# Patient Record
Sex: MALE | Race: WHITE | NOT HISPANIC OR LATINO | Employment: FULL TIME | URBAN - METROPOLITAN AREA
[De-identification: names, ages, dates, MRNs, and addresses within clinical notes are randomized per-mention and may not be internally consistent; named-entity substitution may affect disease eponyms.]

---

## 2019-04-29 ENCOUNTER — HOSPITAL ENCOUNTER (EMERGENCY)
Facility: HOSPITAL | Age: 39
Discharge: HOME | End: 2019-04-29
Attending: EMERGENCY MEDICINE | Admitting: EMERGENCY MEDICINE

## 2019-04-29 VITALS
WEIGHT: 250 LBS | HEART RATE: 81 BPM | BODY MASS INDEX: 30.44 KG/M2 | HEIGHT: 76 IN | DIASTOLIC BLOOD PRESSURE: 88 MMHG | SYSTOLIC BLOOD PRESSURE: 148 MMHG | RESPIRATION RATE: 18 BRPM | OXYGEN SATURATION: 99 % | TEMPERATURE: 98.2 F

## 2019-04-29 DIAGNOSIS — L08.9: Primary | ICD-10-CM

## 2019-04-29 DIAGNOSIS — S30.860A: Primary | ICD-10-CM
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
## 2019-04-29 PROCEDURE — 99282 EMERGENCY DEPT VISIT SF MDM: CPT | Performed by: PHYSICIAN ASSISTANT

## 2019-04-29 PROCEDURE — 99283 EMERGENCY DEPT VISIT LOW MDM: CPT

## 2019-04-29 RX ORDER — DOXYCYCLINE 100 MG/1
100 CAPSULE ORAL ONCE
Status: COMPLETED | OUTPATIENT
Start: 2019-04-29 | End: 2019-04-29

## 2019-04-29 RX ORDER — DOXYCYCLINE 100 MG/1
100 CAPSULE ORAL 2 TIMES DAILY
Qty: 28 CAPSULE | Refills: 0 | Status: SHIPPED | OUTPATIENT
Start: 2019-04-29 | End: 2019-05-13

## 2019-04-29 RX ADMIN — DOXYCYCLINE 100 MG: 100 CAPSULE ORAL at 20:27

## 2021-03-30 ENCOUNTER — IMMUNIZATION (OUTPATIENT)
Dept: VACCINE CLINIC | Facility: HOSPITAL | Age: 41
End: 2021-03-30

## 2021-03-30 PROCEDURE — 91300 HC SARSCOV02 VAC 30MCG/0.3ML IM: CPT | Performed by: OBSTETRICS & GYNECOLOGY

## 2021-03-30 PROCEDURE — 0001A: CPT | Performed by: OBSTETRICS & GYNECOLOGY

## 2021-04-20 ENCOUNTER — IMMUNIZATION (OUTPATIENT)
Dept: VACCINE CLINIC | Facility: HOSPITAL | Age: 41
End: 2021-04-20

## 2021-04-20 PROCEDURE — 0002A: CPT | Performed by: OBSTETRICS & GYNECOLOGY

## 2021-04-20 PROCEDURE — 91300 HC SARSCOV02 VAC 30MCG/0.3ML IM: CPT | Performed by: OBSTETRICS & GYNECOLOGY

## 2022-09-17 ENCOUNTER — APPOINTMENT (OUTPATIENT)
Dept: GENERAL RADIOLOGY | Facility: HOSPITAL | Age: 42
End: 2022-09-17

## 2022-09-17 ENCOUNTER — HOSPITAL ENCOUNTER (EMERGENCY)
Facility: HOSPITAL | Age: 42
Discharge: HOME | End: 2022-09-17
Attending: EMERGENCY MEDICINE | Admitting: EMERGENCY MEDICINE

## 2022-09-17 VITALS
RESPIRATION RATE: 17 BRPM | SYSTOLIC BLOOD PRESSURE: 183 MMHG | TEMPERATURE: 98.8 F | HEART RATE: 60 BPM | OXYGEN SATURATION: 96 % | DIASTOLIC BLOOD PRESSURE: 104 MMHG

## 2022-09-17 DIAGNOSIS — S50.12XA CONTUSION OF LEFT FOREARM, INITIAL ENCOUNTER: Primary | ICD-10-CM

## 2022-09-17 PROCEDURE — 99283 EMERGENCY DEPT VISIT LOW MDM: CPT

## 2022-09-17 PROCEDURE — 73090 X-RAY EXAM OF FOREARM: CPT

## 2022-09-17 PROCEDURE — 73070 X-RAY EXAM OF ELBOW: CPT

## 2022-09-17 PROCEDURE — 73110 X-RAY EXAM OF WRIST: CPT

## 2022-09-17 RX ORDER — IBUPROFEN 600 MG/1
600 TABLET ORAL 3 TIMES DAILY
Qty: 30 TABLET | Refills: 0 | Status: SHIPPED | OUTPATIENT
Start: 2022-09-17

## 2022-09-18 NOTE — ED PROVIDER NOTES
Subjective   History of Present Illness  This is a 42-year-old man with a chief complaint of pain to the left forearm area.  Approximately 4 PM the patient tripped over a few boxes and landed on his outstretched arm.  He complains of pain just proximal to the wrist and the forearm area, as well as pain in the elbow area.  The pain is sharp, it hurts when he moves the area.  He denies any other injuries.  He has no problems moving his fingers.  The patient or the person giving the history on their behalf denies any other precipitating, aggravating, or alleviating symptoms than those listed above.        Review of Systems   Musculoskeletal: Positive for arthralgias and myalgias.   All other systems reviewed and are negative.      History reviewed. No pertinent past medical history.    No Known Allergies    History reviewed. No pertinent surgical history.    History reviewed. No pertinent family history.    Social History     Socioeconomic History   • Marital status:    Tobacco Use   • Smoking status: Current Every Day Smoker     Packs/day: 1.00   Substance and Sexual Activity   • Alcohol use: No   • Drug use: Defer   • Sexual activity: Defer           Objective   Physical Exam  Vitals and nursing note reviewed.   Constitutional:       Appearance: Normal appearance. He is normal weight.   Musculoskeletal:      Comments: There is moderate tenderness to palpation over the radial portion of the left forearm, approximately 4 cm above the wrist area.  There is moderate tenderness across the wrist area, without definitive snuffbox tenderness.  The patient also complains of mild tenderness to palpation in the elbow area to palpation.  Pulses are normal as are capillary refill, movement of the fingers, and sensation of the arm and hand.   Skin:     General: Skin is warm and dry.      Capillary Refill: Capillary refill takes less than 2 seconds.   Neurological:      General: No focal deficit present.      Mental Status:  He is alert and oriented to person, place, and time. Mental status is at baseline.   Psychiatric:         Mood and Affect: Mood normal.         Behavior: Behavior normal.         Thought Content: Thought content normal.         Judgment: Judgment normal.         Procedures           ED Course  ED Course as of 09/17/22 2303   Sat Sep 17, 2022   2302 All films negative [LC]      ED Course User Index  [LC] Nelsy Rubi MD                                           MDM  Number of Diagnoses or Management Options     Amount and/or Complexity of Data Reviewed  Tests in the radiology section of CPT®: ordered        Final diagnoses:   Contusion of left forearm, initial encounter       ED Disposition  ED Disposition     ED Disposition   Discharge    Condition   Stable    Comment   --             Surjit Yarbrough MD  [address] [facility] [address] [phone]      As needed         Medication List      New Prescriptions    ibuprofen 600 MG tablet  Commonly known as: ADVIL,MOTRIN  Take 1 tablet by mouth 3 (Three) Times a Day.           Where to Get Your Medications      You can get these medications from any pharmacy    Bring a paper prescription for each of these medications  · ibuprofen 600 MG tablet          Nelsy Rubi MD  09/17/22 2303

## 2022-09-18 NOTE — ED NOTES
This nurse provided discharge instructions to pt for after visit care and for prescribed medication. Pt denied any questions and verbalized understanding.

## 2024-05-08 ENCOUNTER — HOSPITAL ENCOUNTER (EMERGENCY)
Facility: HOSPITAL | Age: 44
Discharge: HOME | End: 2024-05-08
Attending: STUDENT IN AN ORGANIZED HEALTH CARE EDUCATION/TRAINING PROGRAM
Payer: COMMERCIAL

## 2024-05-08 VITALS
SYSTOLIC BLOOD PRESSURE: 164 MMHG | HEIGHT: 76 IN | BODY MASS INDEX: 26.46 KG/M2 | OXYGEN SATURATION: 99 % | WEIGHT: 217.3 LBS | RESPIRATION RATE: 18 BRPM | TEMPERATURE: 97.7 F | HEART RATE: 58 BPM | DIASTOLIC BLOOD PRESSURE: 110 MMHG

## 2024-05-08 DIAGNOSIS — H81.10 BENIGN PAROXYSMAL POSITIONAL VERTIGO, UNSPECIFIED LATERALITY: Primary | ICD-10-CM

## 2024-05-08 PROCEDURE — 93005 ELECTROCARDIOGRAM TRACING: CPT

## 2024-05-08 PROCEDURE — 99283 EMERGENCY DEPT VISIT LOW MDM: CPT

## 2024-05-08 RX ORDER — MECLIZINE HYDROCHLORIDE 25 MG/1
25 TABLET ORAL 3 TIMES DAILY PRN
Qty: 60 TABLET | Refills: 0 | Status: SHIPPED | OUTPATIENT
Start: 2024-05-08 | End: 2024-05-28

## 2024-05-08 RX ORDER — MECLIZINE HYDROCHLORIDE 25 MG/1
25 TABLET ORAL ONCE
Status: COMPLETED | OUTPATIENT
Start: 2024-05-08 | End: 2024-05-08

## 2024-05-08 RX ORDER — SODIUM CHLORIDE 0.9 % (FLUSH) 0.9 %
10 SYRINGE (ML) INJECTION AS NEEDED
Status: DISCONTINUED | OUTPATIENT
Start: 2024-05-08 | End: 2024-05-08 | Stop reason: HOSPADM

## 2024-05-08 RX ADMIN — MECLIZINE HYDROCHLORIDE 25 MG: 25 TABLET ORAL at 17:34

## 2024-05-08 NOTE — Clinical Note
[facility]  [facility] EMERGENCY DEPARTMENT  [address] [place] [address]  Phone: [phone]    Mayito Mccray was seen and treated in our emergency department on 5/8/2024.  He may return to work on 05/13/2024.         Thank you for choosing [facility].    Jerry Humphrey, DO

## 2024-05-08 NOTE — DISCHARGE INSTRUCTIONS

## 2024-05-08 NOTE — ED PROVIDER NOTES
"Subjective   History of Present Illness  This is a very pleasant 44-year-old male who presents emergency department after being seen and evaluated at the urgent care for dizziness.  The patient states that he does not feel confused but states that he feels \"foggy.\"  Patient denies any nausea, vomiting but does admit to vertigo that is worse when he moves his head to the right and flexes his neck.  He denies tinnitus, recent illness, cough, fever, neck stiffness, headache, focal deficits, diplopia, dysarthria, dysphagia.       Review of Systems   Constitutional:  Negative for activity change, chills, diaphoresis and fever.   HENT: Negative.     Respiratory: Negative.     Cardiovascular: Negative.    Skin: Negative.    Neurological: Negative.  Positive for dizziness.   All other systems reviewed and are negative.      History reviewed. No pertinent past medical history.    No Known Allergies    History reviewed. No pertinent surgical history.    History reviewed. No pertinent family history.    Social History     Socioeconomic History    Marital status:    Tobacco Use    Smoking status: Every Day     Current packs/day: 1.00     Types: Cigarettes    Smokeless tobacco: Never   Vaping Use    Vaping status: Never Used   Substance and Sexual Activity    Alcohol use: No    Drug use: Never    Sexual activity: Defer           Objective   Physical Exam  Vitals and nursing note reviewed.   Constitutional:       Appearance: Normal appearance. He is normal weight. He is not ill-appearing, toxic-appearing or diaphoretic.   HENT:      Head: Normocephalic and atraumatic.      Right Ear: External ear normal.      Left Ear: External ear normal.      Nose: Nose normal. No congestion or rhinorrhea.      Mouth/Throat:      Pharynx: No oropharyngeal exudate or posterior oropharyngeal erythema.   Eyes:      Extraocular Movements: Extraocular movements intact.      Conjunctiva/sclera: Conjunctivae normal.      Pupils: Pupils are " equal, round, and reactive to light.   Cardiovascular:      Rate and Rhythm: Normal rate and regular rhythm.      Pulses: Normal pulses.   Pulmonary:      Effort: Pulmonary effort is normal.      Breath sounds: Normal breath sounds. No stridor. No wheezing, rhonchi or rales.   Chest:      Chest wall: No tenderness.   Abdominal:      General: There is no distension.      Palpations: Abdomen is soft. There is no mass.   Musculoskeletal:         General: No swelling, tenderness or signs of injury. Normal range of motion.      Cervical back: Normal range of motion. No rigidity or tenderness.   Skin:     General: Skin is warm.      Capillary Refill: Capillary refill takes less than 2 seconds.      Coloration: Skin is not jaundiced or pale.      Findings: No bruising.   Neurological:      General: No focal deficit present.      Mental Status: He is alert and oriented to person, place, and time. Mental status is at baseline.      Motor: No weakness.      Comments: On hints exam the patient has a unidirectional nystagmus with no vertical nystagmus, a corrective saccade which is reassuring and no evidence of skew. The HINTS is overall very reassuring.  Finger-nose within normal limits, ambulatory without evidence of ataxia.             Procedures           ED Course                                             Medical Decision Making    MDM:    Escalation of care including admission/observation considered    - Discussions of management with other providers:  None    - Discussed/reviewed with Radiology regarding test interpretation    - Independent interpretation: None    - Additional patient history obtained from: [facility]    - Review of external non-ED record (if available):  Prior Inpt record, Office record, Outpt record, Prior Outpt labs, PCP record, Outside ED record, Other    - Chronic conditions affecting care: See HPI and medical Hx.    - Social Determinants of health significantly affecting care:  None        Medical  Decision Making Discussion:    This is a 44-year-old who presents to the emergency department with what is likely benign paroxysmal positional vertigo.  Patient has no tinnitus, I do not suspect any labyrinth-itis, I do not suspect Ménière's syndrome.  He is given meclizine here in discussion as to continued meclizine use, fluid resuscitation.  He has no evidence of focal neurological deficit, no cerebellar evidence of abnormality.  He has a very reassuring hints exam and we discussed at length how to do the Epley maneuver at home and the use of meclizine on an ongoing basis as needed for his vertigo.  The patient is very agreeable to this, he is otherwise extremely well-appearing, he will perform the Epley maneuver at home and he will return here promptly if there is any acute change or worsening of his condition, the patient is otherwise hemodynamically stable, well-appearing, afebrile and stable for discharge.    The patient has been given very strict return precautions to return to the emergency department should there be any acute change or worsening of their condition.  I have explained my findings and the patient has expressed understanding to me.  I explained that the work-up performed in the ED has been based on the specific complaint and concern, as the nature of emergency medicine is complaint driven and they understand that new symptoms may arise.  I have told them that, should there be any new symptoms, worsening or changing symptoms, a new work-up may be indicated that they are encouraged to return to the emergency department or promptly contact their primary care physician. We have employed a shared decision-making process as the discussion of their disposition.  The patient has been educated as to the nature of the visit, the tests and work-up performed and the findings from today's visit. At this time, there does not appear to be any acute emergent process that necessitates admission to the  hospital, however, the patient understands that this can change unexpectedly. At this time, the patient is stable for discharge home and agrees to follow-up with her primary care physician in the next 24 to 48 hours or earlier should they be able to obtain an appointment.    The patient was counseled regarding diagnostic results and treatment plan and patient has indicated understanding of these instructions.  2        Final diagnoses:   Benign paroxysmal positional vertigo, unspecified laterality       ED Disposition  ED Disposition       ED Disposition   Discharge    Condition   Good    Comment   --               Surjit Yarbrough MD  [address] [facility] [address] [phone]    In 1 week  As needed, If symptoms worsen         Medication List        New Prescriptions      meclizine 25 MG tablet  Commonly known as: ANTIVERT  Take 1 tablet by mouth 3 (Three) Times a Day As Needed for Dizziness for up to 20 days.               Where to Get Your Medications        These medications were sent to [id number] - [facility] SAUC[address] [place] [address] [place] [address] AT [facility] & [address] - [phone]  - [phone] [place] [address], [place] [address]      Phone: [phone]   meclizine 25 MG tablet            Jerry Humphrey,   05/08/24 8315

## 2024-05-08 NOTE — ED TRIAGE NOTES
Pt via PV from home with c/o confusion/dizziness (worse with head movement)/unsteady since Saturday; N/V since last night.   Pt is AAOX4 at this time

## 2024-05-09 LAB
QT INTERVAL: 455 MS
QTC INTERVAL: 432 MS